# Patient Record
(demographics unavailable — no encounter records)

---

## 2025-03-27 NOTE — HISTORY OF PRESENT ILLNESS
[IEP] : Individualized Education Program [PWD] : Preschooler with a Disability [Entering in September] : entering in September [12 mos.] : 12 - Month Special Service and/or Program: Yes [Spec. Transportation] : Special Transportation: Yes [SC: _____] : self-contained [unfilled] [OT: ____] : Occupational Therapy [unfilled] [PT:____] : Physical Therapy [unfilled] [S-L: _____] : Speech/Language Therapy [unfilled] [Aide: _____] : Aide or Paraprofessional [unfilled] [P. Train] : Parent training/counseling [TA: Other] : Other testing accommodations [Asst. Tech.] : Assistive technology service [Other: ____] : [unfilled] [FreeTextEntry4] : Sept 2024-- attends 37R program at [employer] (can stay until 12th gr) [FreeTextEntry3] : dated 7/25/2024 (initial CPSE eval). Annual review scheduled for 7/23/2025  [FreeTextEntry1] : 2024-25 School Year-- .JANINE  attends a full five day in-person learning schedule. [TWNoteComboBox1] : Pre-School

## 2025-03-27 NOTE — HISTORY OF PRESENT ILLNESS
[IEP] : Individualized Education Program [PWD] : Preschooler with a Disability [Entering in September] : entering in September [12 mos.] : 12 - Month Special Service and/or Program: Yes [Spec. Transportation] : Special Transportation: Yes [SC: _____] : self-contained [unfilled] [OT: ____] : Occupational Therapy [unfilled] [PT:____] : Physical Therapy [unfilled] [S-L: _____] : Speech/Language Therapy [unfilled] [Aide: _____] : Aide or Paraprofessional [unfilled] [P. Train] : Parent training/counseling [TA: Other] : Other testing accommodations [Asst. Tech.] : Assistive technology service [Other: ____] : [unfilled] [FreeTextEntry4] : Sept 2024-- attends 37R program at [employer] (can stay until 12th gr) [FreeTextEntry3] : dated 7/25/2024 (initial CPSE eval). Annual review scheduled for 7/23/2025  [FreeTextEntry1] : 2024-25 School Year-- .JANINE  attends a full five day in-person learning schedule. [TWNoteComboBox1] : Pre-School

## 2025-03-27 NOTE — REASON FOR VISIT
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

## 2025-03-27 NOTE — PLAN
[CPSE Evaluation] : - Referred to the Committee on  Special Education for complete evaluation including intelligence, adaptive, speech and language, and motor evaluations [OT Evaluation] : - Occupational therapy evaluation [Clinical Psychology Evaluation] : - Clinical psychology (social-emotional/behavioral) evaluation [Careful Teacher Selection] : - Next year's teacher(s) should be carefully selected to ensure a favorable fit [Implement IEP] : - Implementation of an Individualized Education Program is recommended. [Recommended Classification:____] : - The appropriate IEP classification is: [unfilled] [Self-Contained Special Education (Qualified)] : - Placement in a self-contained special education classroom in which teachers have expertise in teaching children with autism is recommended. However, child should be grouped with verbal children who demonstrate interest in communicating, and who do not have serious disruptive behavior problems [Monitor Attention] : - [unfilled]'s attention skills will need to continue to be monitored [Speech/Language] : - Speech and language therapy  [Occupational Therapy] : - Occupational therapy [Physical Therapy] : - Physical therapy [Social Skills] : - Social skills training [1:1 Aide] : - A 1:1  to facilitate learning in the classroom [Home ZURDO] : - Home Applied Behavioral Analysis (ZURDO) therapy [Cardiology] : - Pediatric Cardiologist [ENT] : - Pediatric Ear, Nose & Throat specialist [Ophthalmology] : - Pediatric Ophthalmologist [PCP] : - Primary Care Provider [Social Skills Group (child)] : - Enrollment of child in a social skills development group [Home Behavior Techniques] : - Specific behavioral techniques that can be implemented at home were discussed [Cessation of screen use before bedtime] : - Ensure cessation of video screen use one hour before bedtime [Limit Screen Time] : - Limit screen time [Rationale Discussed] : - The rationale for treating inattention, distractibility, hyperactivity, or impulsivity with medication was discussed. The desired effects, possible side effects, and need for monitoring response were reviewed. The various available medications were compared and contrasted, and the option of not treating with medication were also discussed [Medication Not Recommended] : - A medication trial was not recommended [Cardiac risk factors for treatment] : - Cardiac risk factors for treatment of stimulant medications were reviewed, including history of prior seizure, unexplained loss of consciousness, congenital heart disease, arrhythmias, or family history of sudden unexplained cardiac death in family members below the age of 40 [Understanding ADHD] : - Understanding ADHD by the American Academy of Pediatrics [mayra.org] : - mayra.org - Children and Adults with Attention Deficit Hyperactivity Disorder [autismspeaks.org] : - autismspeaks.org - Autism Speaks [IEP or IFSP] : - Copy of most recent Individualized Education Program (IEP) or Family Service Plan (IFSP) [Accuracy] : Accuracy and reliability of clinical impressions [Findings (To Date)] : Findings from evaluation (to date) [Clinical Basis] : Clinical basis for current diagnosis and clinical impressions [Differential Diagnosis] : Differential diagnosis [Co-Morbidities] : Clinical disorders and problem commonly associated with this child's condition (now or in the future) [Prognosis] : Prognosis [Developmental Testiing] : Clinical implications of developmental testing [Other: _____] : [unfilled] [Dev. Therapies: ____] : Benefits and limits of developmental therapies: [unfilled] [Behavior Modification] : Behavior modification strategies [Resources] : Other available resources [CPSE / IEP] : Committee on  Special Education (CPSE) evaluations and Individualized Education Programs (IEP) [Class Placement] : Appropriate class placement [Family Questions] : Family's questions were addressed [Diet] : Evidence-based clinical information about diet [Sleep] : The importance of sleep and strategies to ensure adequate sleep [Media / Screen Time] : Importance of limiting electronics, media, and screen time [Exercise] : Regular exercise [Reading] : Importance of daily reading [Injury Prevention] : injury prevention [FreeTextEntry4] : Genetic Testing done per Mom [de-identified] : Child Mind [place] Parents Guide to Autism given to parent [FreeTextEntry1] :  Autism-- Referral given for Home ZURDO & Social Skills programs. Continue with supports per IEP and at home.   Lennox-Guastaut -- continued follow-up with Peds Neurology as needed.  Audiology, Cardiology, Pulmonary & Ophthalmology continuous monitoring recommended.   Speech-- continue with therapies as per JANINE's IEP for articulation and Pragmatic Language Skill development. Will follow-up if .JANINE is allowed to bring her communication device home.  Topics discussed with parent, refer to counseling section of note.  .Parent is aware to call the office as needed should any concerns or questions arise otherwise return in 6-8 months.

## 2025-03-27 NOTE — REVIEW OF SYSTEMS
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

## 2025-03-27 NOTE — REVIEW OF SYSTEMS
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

## 2025-03-27 NOTE — PLAN
[CPSE Evaluation] : - Referred to the Committee on  Special Education for complete evaluation including intelligence, adaptive, speech and language, and motor evaluations [OT Evaluation] : - Occupational therapy evaluation [Clinical Psychology Evaluation] : - Clinical psychology (social-emotional/behavioral) evaluation [Careful Teacher Selection] : - Next year's teacher(s) should be carefully selected to ensure a favorable fit [Implement IEP] : - Implementation of an Individualized Education Program is recommended. [Recommended Classification:____] : - The appropriate IEP classification is: [unfilled] [Self-Contained Special Education (Qualified)] : - Placement in a self-contained special education classroom in which teachers have expertise in teaching children with autism is recommended. However, child should be grouped with verbal children who demonstrate interest in communicating, and who do not have serious disruptive behavior problems [Monitor Attention] : - [unfilled]'s attention skills will need to continue to be monitored [Speech/Language] : - Speech and language therapy  [Occupational Therapy] : - Occupational therapy [Physical Therapy] : - Physical therapy [Social Skills] : - Social skills training [1:1 Aide] : - A 1:1  to facilitate learning in the classroom [Home ZURDO] : - Home Applied Behavioral Analysis (ZURDO) therapy [Cardiology] : - Pediatric Cardiologist [ENT] : - Pediatric Ear, Nose & Throat specialist [Ophthalmology] : - Pediatric Ophthalmologist [PCP] : - Primary Care Provider [Social Skills Group (child)] : - Enrollment of child in a social skills development group [Home Behavior Techniques] : - Specific behavioral techniques that can be implemented at home were discussed [Cessation of screen use before bedtime] : - Ensure cessation of video screen use one hour before bedtime [Limit Screen Time] : - Limit screen time [Rationale Discussed] : - The rationale for treating inattention, distractibility, hyperactivity, or impulsivity with medication was discussed. The desired effects, possible side effects, and need for monitoring response were reviewed. The various available medications were compared and contrasted, and the option of not treating with medication were also discussed [Medication Not Recommended] : - A medication trial was not recommended [Cardiac risk factors for treatment] : - Cardiac risk factors for treatment of stimulant medications were reviewed, including history of prior seizure, unexplained loss of consciousness, congenital heart disease, arrhythmias, or family history of sudden unexplained cardiac death in family members below the age of 40 [Understanding ADHD] : - Understanding ADHD by the American Academy of Pediatrics [mayra.org] : - mayra.org - Children and Adults with Attention Deficit Hyperactivity Disorder [autismspeaks.org] : - autismspeaks.org - Autism Speaks [IEP or IFSP] : - Copy of most recent Individualized Education Program (IEP) or Family Service Plan (IFSP) [Accuracy] : Accuracy and reliability of clinical impressions [Findings (To Date)] : Findings from evaluation (to date) [Clinical Basis] : Clinical basis for current diagnosis and clinical impressions [Differential Diagnosis] : Differential diagnosis [Co-Morbidities] : Clinical disorders and problem commonly associated with this child's condition (now or in the future) [Prognosis] : Prognosis [Developmental Testiing] : Clinical implications of developmental testing [Other: _____] : [unfilled] [Dev. Therapies: ____] : Benefits and limits of developmental therapies: [unfilled] [Behavior Modification] : Behavior modification strategies [Resources] : Other available resources [CPSE / IEP] : Committee on  Special Education (CPSE) evaluations and Individualized Education Programs (IEP) [Class Placement] : Appropriate class placement [Family Questions] : Family's questions were addressed [Diet] : Evidence-based clinical information about diet [Sleep] : The importance of sleep and strategies to ensure adequate sleep [Media / Screen Time] : Importance of limiting electronics, media, and screen time [Exercise] : Regular exercise [Reading] : Importance of daily reading [Injury Prevention] : injury prevention [FreeTextEntry4] : Genetic Testing done per Mom [de-identified] : Child Mind [place] Parents Guide to Autism given to parent [FreeTextEntry1] :  Autism-- Referral given for Home ZRUDO & Social Skills programs. Continue with supports per IEP and at home.   Lennox-Guastaut -- continued follow-up with Peds Neurology as needed.  Audiology, Cardiology, Pulmonary & Ophthalmology continuous monitoring recommended.   Speech-- continue with therapies as per JANINE's IEP for articulation and Pragmatic Language Skill development. Will follow-up if .JANINE is allowed to bring her communication device home.  Topics discussed with parent, refer to counseling section of note.  .Parent is aware to call the office as needed should any concerns or questions arise otherwise return in 6-8 months.

## 2025-03-27 NOTE — REASON FOR VISIT
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

## 2025-03-27 NOTE — PHYSICAL EXAM
[External ears normal] : external ears normal [Normal] : patient has a normal gait [Toe-Walking] : toe-walking [Attention Intact] : attention not intact [Easily Distracted] : easily distracted [Needs frequent redirecting] : needs frequent redirecting [Able to redirect] : able to redirect [Difficulty shifting attention or transitioning] : no difficulty shifting attention or transitioning [Fidgets] : fidgets [Moves quickly from one activity to another] : moves quickly from one activity to another [Well-behaved during visit] : well-behaved during visit [Oppositional] : not oppositional [Appropriate eye contact] : no appropriate eye contact [Quiet/calm] : quiet/calm [Positive mood] : positive mood [Negative mood] : no negative mood [Hypersensitive] : not hypersensitive [Answered questions appropriately] : did not answer questions appropriately [Responds to name] : responds to name [Able to follow one step commands] : able to follow one step commands